# Patient Record
Sex: MALE | Race: BLACK OR AFRICAN AMERICAN | URBAN - METROPOLITAN AREA
[De-identification: names, ages, dates, MRNs, and addresses within clinical notes are randomized per-mention and may not be internally consistent; named-entity substitution may affect disease eponyms.]

---

## 2017-11-02 ENCOUNTER — TELEPHONE (OUTPATIENT)
Dept: GASTROENTEROLOGY | Facility: CLINIC | Age: 77
End: 2017-11-02

## 2017-11-02 NOTE — TELEPHONE ENCOUNTER
Colon Recall with Dr. John Kelley for [id number]. Brielle Sadler see notes from 04/2016. Received above recall but cannot find notation on where this was noted by Dr John Kelley    Last colonoscopy scanned in under Media from [facility] 9/8/2015.     Did you want a 3 year recal

## 2018-08-01 ENCOUNTER — TELEPHONE (OUTPATIENT)
Dept: GASTROENTEROLOGY | Facility: CLINIC | Age: 78
End: 2018-08-01

## 2018-08-01 NOTE — TELEPHONE ENCOUNTER
----- Message from Juju Davalos RN sent at 11/2/2017 10:19 AM CDT -----  Regarding: recall colon  Last colonoscopy scanned in under Media from [facility] 9/8/2015.     Per tele enc 11/2/17 - Dr Jocelin Diaz advised 3 year recall for Sept 2018

## (undated) NOTE — LETTER
[facility], [facility], [facility]  [address] [facility], [address] [facility]  Dept: [phone]    8/1/2018        [place] [phone]             Dear Quinton Martinez